# Patient Record
Sex: MALE | Race: WHITE | Employment: OTHER | URBAN - METROPOLITAN AREA
[De-identification: names, ages, dates, MRNs, and addresses within clinical notes are randomized per-mention and may not be internally consistent; named-entity substitution may affect disease eponyms.]

---

## 2018-05-31 ENCOUNTER — HOSPITAL ENCOUNTER (OUTPATIENT)
Dept: DIABETES SERVICES | Age: 78
Discharge: HOME | End: 2018-05-31
Payer: MEDICARE

## 2018-05-31 PROCEDURE — G0108 DIAB MANAGE TRN  PER INDIV: HCPCS | Performed by: DIETITIAN, REGISTERED

## 2018-06-14 ENCOUNTER — HOSPITAL ENCOUNTER (OUTPATIENT)
Dept: DIABETES SERVICES | Age: 78
Discharge: HOME | End: 2018-06-14
Payer: MEDICARE

## 2018-06-14 PROCEDURE — G0108 DIAB MANAGE TRN  PER INDIV: HCPCS

## 2018-07-06 ENCOUNTER — HOSPITAL ENCOUNTER (OUTPATIENT)
Dept: DIABETES SERVICES | Age: 78
Discharge: HOME | End: 2018-07-06
Payer: MEDICARE

## 2018-07-06 PROCEDURE — G0108 DIAB MANAGE TRN  PER INDIV: HCPCS | Performed by: DIETITIAN, REGISTERED

## 2018-07-26 ENCOUNTER — OFFICE VISIT (OUTPATIENT)
Dept: ENDOCRINOLOGY | Age: 78
End: 2018-07-26

## 2018-07-26 VITALS
WEIGHT: 197 LBS | HEART RATE: 86 BPM | BODY MASS INDEX: 26.68 KG/M2 | SYSTOLIC BLOOD PRESSURE: 105 MMHG | HEIGHT: 72 IN | DIASTOLIC BLOOD PRESSURE: 68 MMHG

## 2018-07-26 RX ORDER — GUAIFENESIN 100 MG/5ML
81 LIQUID (ML) ORAL DAILY
COMMUNITY

## 2018-07-26 RX ORDER — INSULIN GLARGINE 100 [IU]/ML
INJECTION, SOLUTION SUBCUTANEOUS
Refills: 3 | COMMUNITY
Start: 2018-07-20

## 2018-07-26 RX ORDER — BLOOD-GLUCOSE METER
EACH MISCELLANEOUS
Refills: 0 | COMMUNITY
Start: 2018-05-31

## 2018-07-26 RX ORDER — METFORMIN HYDROCHLORIDE 500 MG/1
TABLET ORAL
Refills: 3 | COMMUNITY
Start: 2018-07-20

## 2018-07-26 RX ORDER — LOSARTAN POTASSIUM 50 MG/1
50 TABLET ORAL DAILY
COMMUNITY
Start: 2018-07-22

## 2018-07-26 RX ORDER — BLOOD SUGAR DIAGNOSTIC
STRIP MISCELLANEOUS
Refills: 12 | COMMUNITY
Start: 2018-07-14

## 2018-07-26 RX ORDER — PEN NEEDLE, DIABETIC 31 GX5/16"
NEEDLE, DISPOSABLE MISCELLANEOUS
Refills: 3 | COMMUNITY
Start: 2018-05-31

## 2018-07-26 RX ORDER — LANCETS
EACH MISCELLANEOUS
Refills: 12 | COMMUNITY
Start: 2018-07-14

## 2018-07-26 RX ORDER — BISMUTH SUBSALICYLATE 262 MG
1 TABLET,CHEWABLE ORAL DAILY
COMMUNITY

## 2018-07-26 NOTE — PROGRESS NOTES
Chief Complaint   Patient presents with    New Patient     Referred by Dr. Luis Manuel Mendez Diabetes     Last A1c 14.5% (5/31/18)    Diabetic Foot Exam     Due    Other     Lipid panel and microalbumin due       HPI  This is a very pleasant 81-year-old white male with a 2+ year history of type 2 diabetes mellitus referred for evaluation and management. He was apparently diagnosed with diabetes and found to have an A1c of about 8%. His primary care physician suggested he try diet and he did so. Last fall he had an A1c of 11% but again did not want to go on medication. Unfortunately several weeks ago he noticed increased polyuria, polydipsia and fatigue. .  Because of the  of significant fatigue he went to a new primary care provider who found a glucose of 572, and A1c of 14% and started him on metformin plus Lantus insulin. Currently he takes 500 mg of metformin once daily and Lantus insulin 26 units once daily. He tells me that he lost about 30 pounds over the last 18 months. His diet is not changed that much. He is very active but does not do structured exercise. He is  for 22 years and lives alone. He has cereal and diet juice for breakfast or he may have oatmeal or cream of wheat. Lunch can be yogurt or can be a tuna sandwich or [employer] sandwich with out the bread. He has a piece of fruit in the afternoon. [place] is fish or chicken with rice or peas or squash. He says he can make a meal out of vegetables alone. He frequently has ice cream with peaches and watermelon at dessert time. When he was found to have the elevated A1c of 14%, his blood sugar at the time was 572. With the addition of insulin his blood sugar now are in the 100-120 range both in the morning and in the evening. This has occurred over the last 4-6 weeks. He really has not changed his diet very much by history. ROS  He denies chest pain, shortness of breath, constipation or diarrhea.   He does complain of numbness in his feet.  The remainder of the review of systems was unremarkable. Family History   Problem Relation Age of Onset    Hypertension Mother     Heart Failure Father     Hypertension Father     Kidney Disease Brother         Social History     Social History    Marital status: UNKNOWN     Spouse name: N/A    Number of children: N/A    Years of education: N/A     Social History Main Topics    Smoking status: Never Smoker    Smokeless tobacco: Never Used    Alcohol use None    Drug use: None    Sexual activity: Not Asked     Other Topics Concern    None     Social History Narrative    None        Vitals:    07/26/18 1327   BP: 105/68   Pulse: 86   Weight: 197 lb (89.4 kg)   Height: 6' (1.829 m)   PainSc:   0 - No pain        Physical Examination: General appearance - alert, well appearing, and in no distress  Mental status - alert, oriented to person, place, and time  Neck - supple, no significant adenopathy, thyroid exam: thyroid is normal in size without nodules or tenderness  Chest - clear to auscultation, no wheezes, rales or rhonchi, symmetric air entry  Heart - normal rate, regular rhythm, normal S1, S2, no murmurs, rubs, clicks or gallops  Abdomen - soft, nontender, nondistended, no masses or organomegaly  Extremities - peripheral pulses normal, no pedal edema, no clubbing or cyanosis    Diabetic foot exam:     Left: Reflexes 2+     Vibratory sensation diminished    Filament test normal sensation with micro filament   Pulse DP: 2+ (normal)   Pulse PT: 2+ (normal)   Deformities: None  Right: Reflexes 2+   Vibratory sensation diminished   Pulse DP: 2+ (normal)   Pulse PT: 2+ (normal)   Deformities: None      ASSESSMENT & PLAN  This gentleman has type 2 diabetes mellitus times at least 2 years with a recent A1c of 14% but with significantly improved glucose control following the addition of insulin therapy. He would like to get off insulin if it is possible.   I honestly believe given the 30 pounds of weight loss that he will need to be on some insulin to maintain a normal blood sugar but we have done the followin. I obtained a metabolic panel today to determine his renal function. 2.  Assuming that his renal function is normal, we will advance his metformin as tolerated to a goal of 1000 mg twice daily. 3.  If the Metformin results in reductions in glucose levels, we will taper the insulin accordingly. The level of blood sugar that he has currently is an ideal glucose range. 4.  I will see him back in 1 month.

## 2018-07-27 ENCOUNTER — TELEPHONE (OUTPATIENT)
Dept: ENDOCRINOLOGY | Age: 78
End: 2018-07-27

## 2018-07-27 LAB
ALBUMIN SERPL-MCNC: 4.7 G/DL (ref 3.5–4.8)
ALBUMIN/GLOB SERPL: 1.7 {RATIO} (ref 1.2–2.2)
ALP SERPL-CCNC: 70 IU/L (ref 39–117)
ALT SERPL-CCNC: 23 IU/L (ref 0–44)
AST SERPL-CCNC: 27 IU/L (ref 0–40)
BILIRUB SERPL-MCNC: 0.5 MG/DL (ref 0–1.2)
BUN SERPL-MCNC: 15 MG/DL (ref 8–27)
BUN/CREAT SERPL: 18 (ref 10–24)
CALCIUM SERPL-MCNC: 10.2 MG/DL (ref 8.6–10.2)
CHLORIDE SERPL-SCNC: 100 MMOL/L (ref 96–106)
CO2 SERPL-SCNC: 29 MMOL/L (ref 20–29)
CREAT SERPL-MCNC: 0.83 MG/DL (ref 0.76–1.27)
GLOBULIN SER CALC-MCNC: 2.8 G/DL (ref 1.5–4.5)
GLUCOSE SERPL-MCNC: 86 MG/DL (ref 65–99)
POTASSIUM SERPL-SCNC: 4.9 MMOL/L (ref 3.5–5.2)
PROT SERPL-MCNC: 7.5 G/DL (ref 6–8.5)
SODIUM SERPL-SCNC: 144 MMOL/L (ref 134–144)

## 2018-07-27 RX ORDER — METFORMIN HYDROCHLORIDE 500 MG/1
1000 TABLET, EXTENDED RELEASE ORAL
Qty: 120 TAB | Refills: 10 | Status: SHIPPED | OUTPATIENT
Start: 2018-07-27 | End: 2018-07-27 | Stop reason: SDUPTHER

## 2018-07-27 RX ORDER — METFORMIN HYDROCHLORIDE 500 MG/1
1000 TABLET, EXTENDED RELEASE ORAL 2 TIMES DAILY
Qty: 120 TAB | Refills: 10 | Status: CANCELLED | OUTPATIENT
Start: 2018-07-27

## 2018-07-27 RX ORDER — METFORMIN HYDROCHLORIDE 500 MG/1
TABLET, EXTENDED RELEASE ORAL
Qty: 360 TAB | Refills: 10 | Status: SHIPPED | OUTPATIENT
Start: 2018-07-27

## 2018-08-09 ENCOUNTER — HOSPITAL ENCOUNTER (OUTPATIENT)
Dept: DIABETES SERVICES | Age: 78
Discharge: HOME | End: 2018-08-09
Payer: MEDICARE

## 2018-08-09 PROCEDURE — G0108 DIAB MANAGE TRN  PER INDIV: HCPCS | Performed by: DIETITIAN, REGISTERED

## 2018-09-27 ENCOUNTER — HOSPITAL ENCOUNTER (OUTPATIENT)
Dept: DIABETES SERVICES | Age: 78
Discharge: HOME | End: 2018-09-27
Payer: MEDICARE

## 2018-09-27 PROCEDURE — G0108 DIAB MANAGE TRN  PER INDIV: HCPCS | Performed by: DIETITIAN, REGISTERED

## 2018-09-28 NOTE — DIABETES MGMT
9/27/2018 Dear Morena Paredes MD, Thank you for your kind referral. Your patient, Wade Benavides, attended diabetes education in the past and was here today for an update/review of diabetes at [place] [address] where the following topics were covered today: 
 
  
*Incorporating nutrition management into lifestyle *Monitoring blood glucose and other parameters and interpreting and using the results for self  
management decision making * Preventing, detecting and treating chronic complications * Incorporating physical activity into lifestyle * Using medication(s) safely and for maximum therapeutic benefit * Develop personal strategies to promote health and behavior change Data from this visit: 
Weight: 9/27/18 205.6 #, 8/9/2018 202# HgbA1c: 8/9/2018 6.9% Individual visit Comments:  
 
Pt was seen today for review of BG and f/u. Pt checking BG 1x/daily premeal with BG values ranging from  mg/dL, ~ 18% of BG were between 136 - 142 mg/dL and 82% of BG values were below 130 mg/dL. Pt shared that he has been walking less due to the weather (pt was walking ~ 6 miles 3-4x/weekly). 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 Pt's weight today was 205.6 lb up from 202lb on 8/9/18 and pt was disappointed with this. Pt shared that he feels that his weight has increased because his activity has decreased and because he is snacking more during his job. Pt's shift may start around 10- 11 pm and ends around 8-9 am. 
 
Diet Recall: 
diet cranberry juice 8 - 12 oz, 1 cup oatmeal sweetened with sweet n low 
lunch: may skip Dinner: hamburger (no bun) with 1 cup mixed vegetables (peas and carrots), 1 cup plain vanilla icecream 
snacks during shift: 1-2 banana, 6 crackers, can of [place] sausages, pt may also snack on ~ 1/2 cup peanuts 3-4x/week during the day (snacks on them if they are in sight). Educator reinforced importance of balanced meals for overall intake and BG control. Educator discussed how reading nutrition labels can help with making food choices and to help pt identify where he may be overeating (pt still does not read labels often). Educator stressed that although exercise does help improve overall health that total kcal intake/ and over-snacking will impact his weight as well. Educator encouraged exercise but ultimately reinforced how being more accountable with his intake can help his weight. Educator encouraged pt to keep food log to help with determining foods that are impacting weight gain but pt was hesitant to do this (pt felt this was unnecessary). Educator discussed importance of lean protein with meals and non-starchy vegetables with meals. Pt tended to focus on environmental issues that are discouraging to him (such as Carmelia Bucker is going on in the world today\") and educator encouraged pt to focus on all of the \"good in the world\" to help with decreasing discouragement with overall environment. Discussed how focus on negative aspects can impact BG management. We look forward to assisting your patient in meeting their self-management goals. If you have any questions, please do not hesitate to call the [facility] at [phone]. Sincerely, MARVIN Brown[address] Edilma Fishman [address], [address] Phone: [phone] Fax: [phone]

## 2018-10-22 ENCOUNTER — OFFICE VISIT (OUTPATIENT)
Dept: ENDOCRINOLOGY | Age: 78
End: 2018-10-22

## 2018-10-22 VITALS
SYSTOLIC BLOOD PRESSURE: 139 MMHG | WEIGHT: 208.2 LBS | HEIGHT: 72 IN | DIASTOLIC BLOOD PRESSURE: 80 MMHG | BODY MASS INDEX: 28.2 KG/M2 | HEART RATE: 75 BPM

## 2018-10-22 LAB — HBA1C MFR BLD HPLC: 6 %

## 2018-10-22 RX ORDER — ATORVASTATIN CALCIUM 10 MG/1
TABLET, FILM COATED ORAL
Refills: 1 | COMMUNITY
Start: 2018-08-27

## 2018-10-22 NOTE — PROGRESS NOTES
Mr Mayo Hay returns for follow-up of his type 2 diabetes mellitus currently on metformin 1000 mg twice daily and Lantus insulin 26 units once daily. Since being on this regimen, his hemoglobin A1c has improved dramatically; his A1c today is 6.0%. Prior to starting the medication, his A1c was 14%. He brings in his blood sugar log which he has been keeping since April. Whereas previously the blood sugars were in the 400-500 range, they are now in the 100-120 range consistently. His diet is unchanged. Breakfast is oatmeal or cold cereal and yogurt. He does not have lunch. He has salad or perhaps more yogurt. 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 He finds this activity rewarding and provides lots of companionship. Sometimes he gets the better of the snacks and can see her blood sugars as high as 176 but these are few and far between. Examination  Blood pressure 139/80   pulse 75  Weight 208    Impression Type 2 diabetes with outstanding glucose control on Lantus insulin 26 units and metformin 1000 mg twice daily  Plan: No changes in the regimen were made. We advised him that follow-up can be with his primary care physician and we can certainly see him in the future should it be necessary. We spent more than 25 mintutes face to face, more than 50% was in counseling regarding diet, exercise and carbohydrate intake.

## 2018-12-06 ENCOUNTER — HOSPITAL ENCOUNTER (OUTPATIENT)
Dept: DIABETES SERVICES | Age: 78
Discharge: HOME | End: 2018-12-06
Payer: MEDICARE

## 2018-12-06 PROCEDURE — G0108 DIAB MANAGE TRN  PER INDIV: HCPCS | Performed by: DIETITIAN, REGISTERED

## 2018-12-06 NOTE — DIABETES MGMT
12/6/2018 Dear Alyx Bass MD, Thank you for your kind referral. Your patient, Michael Guidry, attended diabetes education in the past and was here today for an update/review of diabetes at [place] [address] where the following topics were covered today: 
 
*Describing diabetes disease process and treatment options *Incorporating nutrition management into lifestyle *Monitoring blood glucose and other parameters and interpreting and using the results for self  
management decision making *Preventing, detecting and treating acute complications * Incorporating physical activity into lifestyle * Using medication(s) safely and for maximum therapeutic benefit * Develop personal strategies to promote health and behavior change Data from this visit: 
Weight: 12/6/2018 206.9# HgbA1c: 12/6/2018 5.9% Comments: 
Pt seen individually for BG management/DM control review. Pt brought BG logs. Pt checks BG 1x/daily, fasting; fasting BG valus 92 - 134 mg/dL. Pt only had 1 fasting BG > 130 mg/dL in the last 3 weeks. Pt declines having s/s of low BG and no BG values < 80 mg/dL. Pt shared that he is displeased that his weight has gone up, pt's weight : 202 lbs 8/9/2018, 205.6 lb 9/27/18 and now 206.9 lb. Pt shared that he has not been exercising due to season changes (as it gets darker sooner and is colder outside). Pt shared that due to the holidays, he has been eating larger portions as well. Pt continues on Lantus 26 units daily, reports that he continues metformin BID but often misses his night-time dose. Pt overall \"pleased with my numbers\". Pt had questions regarding requirement of insulin, also had questions regarding \"insulin that you can buy over the counter\". Pt visited with PCP yesterday and MD shared that he feels he will need insulin for the rest of his life.  
Diet recall revealed: pt may have cereal with banana in the am, continues to skip lunch often (although may take \"something small like nabs\") and dinner may include double cheeseburger with chips and 3 peppermint shamir minis ( although this meal is not typical of intake). Educator reinforced importance of balanced meals for overall intake and BG control. Reinforced negative impacts on skipping meals on overall health and BG control, discussed continued use of insulin given PCP direction; also discussed that BG have been in target with daily Lantus (and without insulin, pt's A1c > 14 %). Educator discussed differences between NPH and Lantus, provided coupon for Lantus and encouraged pt to see if he can use this to help with cost (as pt mentioned difficulty in affording insulin). Discussed how holiday eating can lead to weight gain, discussed how \"there is always some reason to celebrate with food\" so moderation is key during holidays as well. Discussed ways pt can incorporate exercise into regimen such as walking indoors at [employer] [place] (pt used to do this before). Reviewed s/s of low BG and suggested that pt check BG at later time of day as well as fasting to monitor BG (hgb A1c is in really good control and want to make sure he is not having unfelt low BG). Pt to have 30 minutes f/u in ~ 2 months for review of BG, intake, exercise intervention assessment. We look forward to assisting your patient in meeting their self-management goals. If you have any questions, please do not hesitate to call the [facility] at [phone]. Sincerely, MARVIN Riggins [address] [place] [address] 1 [address], [address] [facility] Phone: [phone] Fax: [phone]

## 2019-02-25 ENCOUNTER — HOSPITAL ENCOUNTER (OUTPATIENT)
Dept: DIABETES SERVICES | Age: 79
Discharge: HOME | End: 2019-02-25
Payer: MEDICARE

## 2019-02-25 PROCEDURE — G0108 DIAB MANAGE TRN  PER INDIV: HCPCS | Performed by: DIETITIAN, REGISTERED

## 2019-02-25 NOTE — DIABETES MGMT
2/25/2019 Dear Emelia Roa MD, Thank you for your kind referral. Your patient, Jon Ramirez, attended diabetes education in the past and was here today for an update/review of diabetes at [place] [address] where the following topics were covered today: 
*Describing diabetes disease process and treatment options *Incorporating nutrition management into lifestyle *Monitoring blood glucose and other parameters and interpreting and using the results for self  
management decision making *Preventing, detecting and treating acute complications * Preventing, detecting and treating chronic complications * Incorporating physical activity into lifestyle * Using medication(s) safely and for maximum therapeutic benefit * Develop personal strategies to promote health and behavior change Data from this visit: 
Weight: 12/6/2018 206.9# HgbA1c: 12/6/2018 5.9% Comments: 
Pt seen individually for f/u refresher education to review/monitor progress with DM. Pt's weight has increased from 206.9 lb in 12/2018 to currently 210.8 lbs today. Pt shared that \" I am disappointed with this weight gain\". Prior to DM diagnosis pt weighed about 225 lbs - reports \" but that was too heavy and I was trying to lose weight then as well\". Pt brought BG logs in for review today. Pt checking BG fasting - fasting BG 80 - 130 mg/dL. Pt checked before bedtime with BG of 160 mg/dL and shared \"but this was after a feast\". Pt has had difficulty in having motivation to increase his exercise through walking. Pt shared that he has not walked indoors at the [employer] up the road due to \"not having anyone to go with me\". Pt's friend, who pt has walked with before, does not express same desire to increase exercise as pt and therefore has made it difficult for pt to remain accountable.  
 
Pt eats out at restaurants 4-6x/weekly; shared \" I watch what I eat, and I don't come out of restaurants feeling uncomfortable so I really don't feel like that is the problem\". Pt will have days where he may only consume 1 meal and fruit cup as a snack. Pt drinking ~ 8 oz glass of \"50/50\" orange juice daily. Pt, again, asked Keron Bibianalance is it that some of my friends are controlled [diabetes] with only metformin and I need insulin\", \"I am really wondering if I need the insulin\". Pt shared that he feels insulin is causing his weight gain. Educator addressed pt's question related to needing insulin or not - discussed that everyone's DM is different and currently pt's DM is controlled with insulin but to further discuss with PCP. Educator discussed that if his PCP wants to trial him off the insulin or begin decreasing his Lantus, he can opt to do that if he feels inclined, but currently pt 's BG are controlled. Educator reinforced exercise and how it can help with DM but also weight loss, but ultimately discussed that food intake either drives weight gain or weight loss. Discussed that if pt is consuming > kcal than needed, he will continue to gain weight - discussed that pt's consumption of fast food may be more calories than he realizes - reviewed meal that pt consumes from [employer] (which was ~ 1000 kcal). Discussed this in relation to weight gain and weight maintenance. Also discussed sugary beverage intake in relation to weight gain - suggested that pt consume 0 kcal beverages. Educator, again, reinforced how eating irregularly can impede weight loss of negatively affect weight - discussed eating breakfast, lunch and dinner - pt willing to incorporate lunch into daily intake again. Pt has f/u appointment next month to review BG and weight progress. We look forward to assisting your patient in meeting their self-management goals. If you have any questions, please do not hesitate to call the [facility] at [phone]. Sincerely, Avis Mcmullen RD 
 [facility] [address] [facility] [address] [place] [address], [address] Phone: [phone] Fax: [phone]

## 2019-03-18 ENCOUNTER — HOSPITAL ENCOUNTER (OUTPATIENT)
Dept: DIABETES SERVICES | Age: 79
Discharge: HOME | End: 2019-03-18
Payer: MEDICARE

## 2019-03-18 PROCEDURE — G0108 DIAB MANAGE TRN  PER INDIV: HCPCS | Performed by: DIETITIAN, REGISTERED

## 2019-03-18 NOTE — DIABETES MGMT
3/18/2019      Dear Cynthia Dang MD,    Thank you for your kind referral. Your patient, Kulwinder Modi, attended diabetes education in the past and was here today for an update/review of diabetes at [place] [address] where the following topics were covered today:  *Describing diabetes disease process and treatment options   *Incorporating nutrition management into lifestyle   *Monitoring blood glucose and other parameters and interpreting and using the results for self   management decision making   *Preventing, detecting and treating acute complications   * Preventing, detecting and treating chronic complications   * Incorporating physical activity into lifestyle   * Using medication(s) safely and for maximum therapeutic benefit   * Develop personal strategies to promote health and behavior change    Data from this visit:  Weight: 12/6/2018 206.9#  HgbA1c: 3/18/2019 6.3%    Comments:  Pt seen individually for f/u with DM care progress. Pt has been taking metformin 500 - 1000 mg daily (\"sometimes I don't take both pills) and also taking lantus 20 - 22 units daily. Pt shared that he visited with PCP a few weeks ago and suggested to his PCP that \" I play with the dosing of the Lantus a bit\" to see if he can reduce his dose while having target BG. Pt continues to check BG 1x/daily, mostly fasting BG - average BG in the last ~45 days was ~ 116 mg/dL from values provided. Pt shared that he notices that he can not always take Lantus at 20 units because \"it'll go up a bit\". Pt's weight is stable today at 209.7 lbs (last weight was ~ 210.8 lbs). Pt still expresses difficulty in increasing exercises due to weather; also reports weather this past winter has greatly affected his mood. Pt shared that he has been eating out more often. Pt shared that his friend had five different cakes so there was an occasion where he ate 5 slices of cake for dinner.     Educator reviewed pt's BG values today; discussed importance in working to put plan in place to help keep activity consistent. Reinforced that, given pt's history in weight gain related to less activity, cycle of weight gain in winter will likely continue if eating habits remain the same (especially with eating out). Overall, pt refrains from consuming excess portions of food and avoids \"stuffing myself\" at meals. Discussed walking with music, joining/participating in 5 k walk (as he would be walking with people during the race) to help with motivation to increase activity. We look forward to assisting your patient in meeting their self-management goals. If you have any questions, please do not hesitate to call the [facility] at [phone].     Sincerely,  Adryan Chauhan [address] [place] [address] Alejandra [address] [place] [address], [address]  Phone: [phone] Fax: [phone]

## 2019-04-24 ENCOUNTER — HOSPITAL ENCOUNTER (OUTPATIENT)
Dept: DIABETES SERVICES | Age: 79
Discharge: HOME | End: 2019-04-24
Payer: MEDICARE

## 2019-04-24 PROCEDURE — G0108 DIAB MANAGE TRN  PER INDIV: HCPCS | Performed by: DIETITIAN, REGISTERED

## 2019-04-25 NOTE — DIABETES MGMT
4/24/2019 Dear Young Sesay MD, Thank you for your kind referral. Your patient, Piter Chamberlain, attended diabetes education in the past and was here today for an update/review of diabetes at [place] [address] where the following topics were covered today: 
*Describing diabetes disease process and treatment options *Incorporating nutrition management into lifestyle *Monitoring blood glucose and other parameters and interpreting and using the results for self  
management decision making *Preventing, detecting and treating acute complications * Preventing, detecting and treating chronic complications * Incorporating physical activity into lifestyle * Using medication(s) safely and for maximum therapeutic benefit * Develop personal strategies to promote health and behavior change Data from this visit: 
Weight: 4/24/2019 206.4# HgbA1c: 3/18/2019 6.3% Comments: 
Pt seen today individually for review of BG logs and progress with DM care. Pt shared that his PCP has decreased his Lantus from ~ 22 units daily to 16 units daily. Pt taking 14-16 units daily and also titrating his metformin to 500 mg daily. Pt brought BG logs today, checking fasting BG with average of 119 mg/dL of the 58 values provided (58 days of fasting BG values). Pt did have one BG of 150 and one BG of 191 mg/dL where he was able to identify factors impacting higher BG - pt identified eating out and drinking 12 oz regular soda drink + snacking at night as likely reason for elevated BG these times. Pt denies missing Lantus. Pt has been moving more, although shares that \" that isn't real exercise\" referring to working on his feet and movements around the home. Pt continues to lift some free 5-10 lbs weights several times weekly to keep up his strength.  Pt continues to desire weight loss until 195 lbs but knows that his eating habits could improve. Pt continues to consume fast food/restaurant foods often with his friend/friends, does continue to share meals to avoid over-eating. Pt with 3.3 lb weight loss since last month. Pt reports feeling well, although made mention that he is tired. Educator reviewed BG logs, reinforced that titrating his metformin down may result in higher lantus requirement given metformin's action in the body - we have discussed this before; however, pt continues to titrate this medication downward. Discussed that while pt's BG average in target BG, decreasing his Lantus is likely resulting in BGs in low 130s mg/dL range. Discussed trying to take BG at different times of day either pre-meal or 2 hours post meal to review if pt has larger fluctuations in BG - also may help reinforce how eating at restaurants could cause higher BG value depending on meal items consumed. Discussed that exercise does not have to be \"scheduled\" or \"walking for set amount of time\" if this is not doable, discussed how movement throughout the day or smaller walks throughout the day can still have good impact on health and BG. Reinforced importance in adequate CHO intake and good BG control to prevent fatigue. Discussed what pt's plans are if next hgb A1c is elevated - as making changes suggested could help result in preventing increasing hgb A1c. Pt shared that he plans to increase walking and \"lose this weight\". Pt has f/u in 1 month per pt preference. We look forward to assisting your patient in meeting their self-management goals. If you have any questions, please do not hesitate to call the [facility] at [phone]. Sincerely, Khushi Tello RD [facility] [address] [facility] [address], [address] Phone: [phone] Fax: [phone]

## 2019-05-09 ENCOUNTER — HOSPITAL ENCOUNTER (OUTPATIENT)
Dept: DIABETES SERVICES | Age: 79
Discharge: HOME | End: 2019-05-09
Payer: MEDICARE

## 2019-05-09 PROCEDURE — G0108 DIAB MANAGE TRN  PER INDIV: HCPCS | Performed by: DIETITIAN, REGISTERED

## 2019-05-09 NOTE — DIABETES MGMT
5/9/2019 Dear Marline See MD, Thank you for your kind referral. Your patient, Edelmira Alicea, attended diabetes education in the past and was here today for an update/review of diabetes at [place] [id number] where the following topics were covered today: 
 
*Describing diabetes disease process and treatment options *Incorporating nutrition management into lifestyle *Monitoring blood glucose and other parameters and interpreting and using the results for self  
management decision making *Preventing, detecting and treating acute complications * Preventing, detecting and treating chronic complications * Incorporating physical activity into lifestyle * Using medication(s) safely and for maximum therapeutic benefit * Develop personal strategies to promote health and behavior change Data from this visit: 
Weight: 5/9/2019 206.5# HgbA1c: 5/9/2019 6.3% Comments: 
Pt seen individually today for f/u DM management. Pt continues on Lantus 14 units daily and takes metformin 500x/daily which he decreased himself from 500 mg BID. Pt checking BG 1x/daily with BG ranging from 90 - 140 mg/dL. Pt is able to identify reasons for higher than target values - pt shared that his higher BG values are likely due to snacking in the middle of the night or snacking on ice-cream. 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 Pt continues to eat out frequently, but reports that he no longer eats until he is stuffed - shared \" I have got that out of my head now\". Pt is doing more walking in short durations, still wishes to walk more. Pt's hgb A1c today (using [employer] Analyzer) was 6.3%. Educator today reinforced protein with meals, including activity when able and that shorter duration is perfectly fine to do.  Reinforced metformin's role in BG management and to consider to take 500 mg BID as this may help limit amount of insulin pt takes - to discuss with MD. Educator reinforced importance in keeping up with standards of care - foot checks, eye exam, etc. Pt pleased with BG and hgb A1c today and plans to follow prn. We look forward to assisting your patient in meeting their self-management goals. If you have any questions, please do not hesitate to call the [facility] at [phone]. Sincerely, José Miguel Berry RD [facility] [address], [address] Phone: [phone] Fax: [phone]